# Patient Record
Sex: FEMALE | Race: WHITE | NOT HISPANIC OR LATINO | URBAN - METROPOLITAN AREA
[De-identification: names, ages, dates, MRNs, and addresses within clinical notes are randomized per-mention and may not be internally consistent; named-entity substitution may affect disease eponyms.]

---

## 2017-01-12 ENCOUNTER — APPOINTMENT (OUTPATIENT)
Dept: WOMENS IMAGING | Facility: HOSPITAL | Age: 62
End: 2017-01-12

## 2017-01-12 PROCEDURE — 77067 SCR MAMMO BI INCL CAD: CPT | Performed by: RADIOLOGY

## 2017-01-12 PROCEDURE — 77063 BREAST TOMOSYNTHESIS BI: CPT | Performed by: RADIOLOGY

## 2018-08-13 ENCOUNTER — APPOINTMENT (OUTPATIENT)
Dept: WOMENS IMAGING | Facility: HOSPITAL | Age: 63
End: 2018-08-13

## 2018-08-13 PROCEDURE — 77063 BREAST TOMOSYNTHESIS BI: CPT | Performed by: RADIOLOGY

## 2018-08-13 PROCEDURE — 77067 SCR MAMMO BI INCL CAD: CPT | Performed by: RADIOLOGY

## 2019-08-26 ENCOUNTER — APPOINTMENT (OUTPATIENT)
Dept: WOMENS IMAGING | Facility: HOSPITAL | Age: 64
End: 2019-08-26

## 2019-08-26 PROCEDURE — 77063 BREAST TOMOSYNTHESIS BI: CPT | Performed by: RADIOLOGY

## 2019-08-26 PROCEDURE — 77067 SCR MAMMO BI INCL CAD: CPT | Performed by: RADIOLOGY

## 2020-08-27 ENCOUNTER — APPOINTMENT (OUTPATIENT)
Dept: WOMENS IMAGING | Facility: HOSPITAL | Age: 65
End: 2020-08-27

## 2020-08-27 PROCEDURE — 77063 BREAST TOMOSYNTHESIS BI: CPT | Performed by: RADIOLOGY

## 2020-08-27 PROCEDURE — 77067 SCR MAMMO BI INCL CAD: CPT | Performed by: RADIOLOGY

## 2021-10-19 ENCOUNTER — APPOINTMENT (OUTPATIENT)
Dept: WOMENS IMAGING | Facility: HOSPITAL | Age: 66
End: 2021-10-19

## 2021-10-19 PROCEDURE — 77063 BREAST TOMOSYNTHESIS BI: CPT | Performed by: RADIOLOGY

## 2021-10-19 PROCEDURE — 77067 SCR MAMMO BI INCL CAD: CPT | Performed by: RADIOLOGY

## 2022-10-28 ENCOUNTER — APPOINTMENT (OUTPATIENT)
Dept: WOMENS IMAGING | Facility: HOSPITAL | Age: 67
End: 2022-10-28

## 2022-10-28 PROCEDURE — 77067 SCR MAMMO BI INCL CAD: CPT | Performed by: RADIOLOGY

## 2022-10-28 PROCEDURE — 77063 BREAST TOMOSYNTHESIS BI: CPT | Performed by: RADIOLOGY

## 2024-01-15 ENCOUNTER — ANESTHESIA EVENT (OUTPATIENT)
Dept: PERIOP | Facility: HOSPITAL | Age: 69
End: 2024-01-15
Payer: MEDICARE

## 2024-01-15 RX ORDER — SODIUM CHLORIDE 9 MG/ML
40 INJECTION, SOLUTION INTRAVENOUS AS NEEDED
Status: CANCELLED | OUTPATIENT
Start: 2024-01-15

## 2024-01-15 RX ORDER — SODIUM CHLORIDE 0.9 % (FLUSH) 0.9 %
10 SYRINGE (ML) INJECTION EVERY 12 HOURS SCHEDULED
Status: CANCELLED | OUTPATIENT
Start: 2024-01-15

## 2024-01-15 RX ORDER — FAMOTIDINE 10 MG/ML
20 INJECTION, SOLUTION INTRAVENOUS ONCE
Status: CANCELLED | OUTPATIENT
Start: 2024-01-15 | End: 2024-01-15

## 2024-01-15 RX ORDER — SODIUM CHLORIDE 0.9 % (FLUSH) 0.9 %
10 SYRINGE (ML) INJECTION AS NEEDED
Status: CANCELLED | OUTPATIENT
Start: 2024-01-15

## 2024-01-16 ENCOUNTER — APPOINTMENT (OUTPATIENT)
Dept: GENERAL RADIOLOGY | Facility: HOSPITAL | Age: 69
End: 2024-01-16
Payer: MEDICARE

## 2024-01-16 ENCOUNTER — ANESTHESIA EVENT CONVERTED (OUTPATIENT)
Dept: ANESTHESIOLOGY | Facility: HOSPITAL | Age: 69
End: 2024-01-16
Payer: MEDICARE

## 2024-01-16 ENCOUNTER — ANESTHESIA (OUTPATIENT)
Dept: PERIOP | Facility: HOSPITAL | Age: 69
End: 2024-01-16
Payer: MEDICARE

## 2024-01-16 ENCOUNTER — HOSPITAL ENCOUNTER (OUTPATIENT)
Facility: HOSPITAL | Age: 69
Discharge: HOME | End: 2024-01-16
Attending: ORTHOPAEDIC SURGERY | Admitting: ORTHOPAEDIC SURGERY
Payer: MEDICARE

## 2024-01-16 VITALS
SYSTOLIC BLOOD PRESSURE: 132 MMHG | HEIGHT: 65 IN | RESPIRATION RATE: 16 BRPM | TEMPERATURE: 97.7 F | WEIGHT: 135 LBS | BODY MASS INDEX: 22.49 KG/M2 | HEART RATE: 70 BPM | OXYGEN SATURATION: 92 % | DIASTOLIC BLOOD PRESSURE: 69 MMHG

## 2024-01-16 DIAGNOSIS — S42.032A CLOSED DISPLACED FRACTURE OF ACROMIAL END OF LEFT CLAVICLE, INITIAL ENCOUNTER: Primary | ICD-10-CM

## 2024-01-16 PROCEDURE — 25010000002 SUGAMMADEX 200 MG/2ML SOLUTION: Performed by: NURSE ANESTHETIST, CERTIFIED REGISTERED

## 2024-01-16 PROCEDURE — 25010000002 ONDANSETRON PER 1 MG: Performed by: NURSE ANESTHETIST, CERTIFIED REGISTERED

## 2024-01-16 PROCEDURE — C1713 ANCHOR/SCREW BN/BN,TIS/BN: HCPCS | Performed by: ORTHOPAEDIC SURGERY

## 2024-01-16 PROCEDURE — 25010000002 DEXAMETHASONE PER 1 MG: Performed by: NURSE ANESTHETIST, CERTIFIED REGISTERED

## 2024-01-16 PROCEDURE — 25010000002 CEFAZOLIN PER 500 MG: Performed by: ORTHOPAEDIC SURGERY

## 2024-01-16 PROCEDURE — 25010000002 FENTANYL CITRATE (PF) 50 MCG/ML SOLUTION: Performed by: NURSE ANESTHETIST, CERTIFIED REGISTERED

## 2024-01-16 PROCEDURE — 25010000002 PHENYLEPHRINE 10 MG/ML SOLUTION 1 ML VIAL: Performed by: NURSE ANESTHETIST, CERTIFIED REGISTERED

## 2024-01-16 PROCEDURE — 25010000002 DEXAMETHASONE SODIUM PHOSPHATE 10 MG/ML SOLUTION: Performed by: NURSE ANESTHETIST, CERTIFIED REGISTERED

## 2024-01-16 PROCEDURE — 25010000002 PROPOFOL 10 MG/ML EMULSION: Performed by: NURSE ANESTHETIST, CERTIFIED REGISTERED

## 2024-01-16 PROCEDURE — 25010000002 ROPIVACAINE HCL-NACL 0.2-0.9 % SOLUTION: Performed by: NURSE ANESTHETIST, CERTIFIED REGISTERED

## 2024-01-16 PROCEDURE — 76000 FLUOROSCOPY <1 HR PHYS/QHP: CPT

## 2024-01-16 PROCEDURE — 25810000003 LACTATED RINGERS PER 1000 ML: Performed by: ANESTHESIOLOGY

## 2024-01-16 PROCEDURE — 25010000002 BUPIVACAINE (PF) 0.25 % SOLUTION: Performed by: NURSE ANESTHETIST, CERTIFIED REGISTERED

## 2024-01-16 DEVICE — SCRW MTPHSL S/TAP T8STRDRV 2.7X22MM: Type: IMPLANTABLE DEVICE | Site: CLAVICLE | Status: FUNCTIONAL

## 2024-01-16 DEVICE — IMPLANTABLE DEVICE: Type: IMPLANTABLE DEVICE | Site: CLAVICLE | Status: FUNCTIONAL

## 2024-01-16 DEVICE — SCRW LK S/TAP T8STRDRV 2.7X12MM: Type: IMPLANTABLE DEVICE | Site: CLAVICLE | Status: FUNCTIONAL

## 2024-01-16 DEVICE — SCRW MTPHSL S/TAP T8STRDRV 2.7X16MM: Type: IMPLANTABLE DEVICE | Site: CLAVICLE | Status: FUNCTIONAL

## 2024-01-16 DEVICE — SCRW LK S/TAP T8STRDRV 2.7X16MM: Type: IMPLANTABLE DEVICE | Site: CLAVICLE | Status: FUNCTIONAL

## 2024-01-16 RX ORDER — ALUMINA, MAGNESIA, AND SIMETHICONE 2400; 2400; 240 MG/30ML; MG/30ML; MG/30ML
SUSPENSION ORAL EVERY 6 HOURS PRN
COMMUNITY

## 2024-01-16 RX ORDER — BUPIVACAINE HYDROCHLORIDE 2.5 MG/ML
INJECTION, SOLUTION EPIDURAL; INFILTRATION; INTRACAUDAL
Status: COMPLETED | OUTPATIENT
Start: 2024-01-16 | End: 2024-01-16

## 2024-01-16 RX ORDER — PSEUDOEPHEDRINE HYDROCHLORIDE 60 MG/1
60 TABLET, FILM COATED ORAL EVERY 4 HOURS PRN
COMMUNITY

## 2024-01-16 RX ORDER — EPHEDRINE SULFATE 50 MG/ML
INJECTION INTRAVENOUS AS NEEDED
Status: DISCONTINUED | OUTPATIENT
Start: 2024-01-16 | End: 2024-01-16 | Stop reason: SURG

## 2024-01-16 RX ORDER — MELOXICAM 15 MG/1
15 TABLET ORAL DAILY
COMMUNITY

## 2024-01-16 RX ORDER — FENTANYL CITRATE 50 UG/ML
INJECTION, SOLUTION INTRAMUSCULAR; INTRAVENOUS
Status: COMPLETED | OUTPATIENT
Start: 2024-01-16 | End: 2024-01-16

## 2024-01-16 RX ORDER — TRIAMCINOLONE ACETONIDE 55 UG/1
2 SPRAY, METERED NASAL DAILY
COMMUNITY

## 2024-01-16 RX ORDER — FLUOXETINE HYDROCHLORIDE 40 MG/1
40 CAPSULE ORAL DAILY
COMMUNITY

## 2024-01-16 RX ORDER — DENOSUMAB 60 MG/ML
60 INJECTION SUBCUTANEOUS
COMMUNITY

## 2024-01-16 RX ORDER — ASPIRIN 81 MG/1
81 TABLET ORAL DAILY
COMMUNITY

## 2024-01-16 RX ORDER — DEXAMETHASONE SODIUM PHOSPHATE 10 MG/ML
INJECTION, SOLUTION INTRAMUSCULAR; INTRAVENOUS
Status: COMPLETED | OUTPATIENT
Start: 2024-01-16 | End: 2024-01-16

## 2024-01-16 RX ORDER — LORATADINE 10 MG/1
CAPSULE, LIQUID FILLED ORAL
COMMUNITY

## 2024-01-16 RX ORDER — ONDANSETRON 2 MG/ML
INJECTION INTRAMUSCULAR; INTRAVENOUS AS NEEDED
Status: DISCONTINUED | OUTPATIENT
Start: 2024-01-16 | End: 2024-01-16 | Stop reason: SURG

## 2024-01-16 RX ORDER — LEVOTHYROXINE SODIUM 0.07 MG/1
75 TABLET ORAL DAILY
COMMUNITY

## 2024-01-16 RX ORDER — ONDANSETRON 4 MG/1
4 TABLET, FILM COATED ORAL EVERY 8 HOURS PRN
Qty: 12 TABLET | Refills: 0 | Status: SHIPPED | OUTPATIENT
Start: 2024-01-16

## 2024-01-16 RX ORDER — HYDROCODONE BITARTRATE AND ACETAMINOPHEN 7.5; 325 MG/1; MG/1
1 TABLET ORAL EVERY 4 HOURS PRN
Qty: 24 TABLET | Refills: 0 | Status: SHIPPED | OUTPATIENT
Start: 2024-01-16

## 2024-01-16 RX ORDER — PHENYLEPHRINE HCL IN 0.9% NACL 1 MG/10 ML
SYRINGE (ML) INTRAVENOUS AS NEEDED
Status: DISCONTINUED | OUTPATIENT
Start: 2024-01-16 | End: 2024-01-16 | Stop reason: SURG

## 2024-01-16 RX ORDER — CALCIUM CARBONATE/VITAMIN D3 600 MG-20
600 TABLET,CHEWABLE ORAL
COMMUNITY

## 2024-01-16 RX ORDER — ACETAMINOPHEN 500 MG
500 TABLET ORAL EVERY 6 HOURS PRN
COMMUNITY
End: 2024-01-16 | Stop reason: HOSPADM

## 2024-01-16 RX ORDER — FAMOTIDINE 20 MG/1
20 TABLET, FILM COATED ORAL ONCE
Status: COMPLETED | OUTPATIENT
Start: 2024-01-16 | End: 2024-01-16

## 2024-01-16 RX ORDER — ESOMEPRAZOLE MAGNESIUM 40 MG/1
40 CAPSULE, DELAYED RELEASE ORAL
Status: ON HOLD | COMMUNITY
End: 2024-01-16

## 2024-01-16 RX ORDER — ROCURONIUM BROMIDE 10 MG/ML
INJECTION, SOLUTION INTRAVENOUS AS NEEDED
Status: DISCONTINUED | OUTPATIENT
Start: 2024-01-16 | End: 2024-01-16 | Stop reason: SURG

## 2024-01-16 RX ORDER — MIDAZOLAM HYDROCHLORIDE 1 MG/ML
0.5 INJECTION INTRAMUSCULAR; INTRAVENOUS
Status: DISCONTINUED | OUTPATIENT
Start: 2024-01-16 | End: 2024-01-16 | Stop reason: HOSPADM

## 2024-01-16 RX ORDER — SUCRALFATE 1 G/1
1 TABLET ORAL 4 TIMES DAILY
COMMUNITY

## 2024-01-16 RX ORDER — DEXLANSOPRAZOLE 60 MG/1
60 CAPSULE, DELAYED RELEASE ORAL DAILY
COMMUNITY

## 2024-01-16 RX ORDER — CLONAZEPAM 1 MG/1
1 TABLET ORAL 2 TIMES DAILY PRN
COMMUNITY

## 2024-01-16 RX ORDER — LIDOCAINE HYDROCHLORIDE 10 MG/ML
INJECTION, SOLUTION EPIDURAL; INFILTRATION; INTRACAUDAL; PERINEURAL AS NEEDED
Status: DISCONTINUED | OUTPATIENT
Start: 2024-01-16 | End: 2024-01-16 | Stop reason: SURG

## 2024-01-16 RX ORDER — PROPOFOL 10 MG/ML
VIAL (ML) INTRAVENOUS AS NEEDED
Status: DISCONTINUED | OUTPATIENT
Start: 2024-01-16 | End: 2024-01-16 | Stop reason: SURG

## 2024-01-16 RX ORDER — SODIUM CHLORIDE, SODIUM LACTATE, POTASSIUM CHLORIDE, CALCIUM CHLORIDE 600; 310; 30; 20 MG/100ML; MG/100ML; MG/100ML; MG/100ML
9 INJECTION, SOLUTION INTRAVENOUS CONTINUOUS
Status: DISCONTINUED | OUTPATIENT
Start: 2024-01-16 | End: 2024-01-16 | Stop reason: HOSPADM

## 2024-01-16 RX ORDER — LIDOCAINE HYDROCHLORIDE 10 MG/ML
0.5 INJECTION, SOLUTION EPIDURAL; INFILTRATION; INTRACAUDAL; PERINEURAL ONCE AS NEEDED
Status: COMPLETED | OUTPATIENT
Start: 2024-01-16 | End: 2024-01-16

## 2024-01-16 RX ORDER — PROPRANOLOL HYDROCHLORIDE 10 MG/1
10 TABLET ORAL 2 TIMES DAILY
COMMUNITY

## 2024-01-16 RX ORDER — HYDROMORPHONE HYDROCHLORIDE 1 MG/ML
0.5 INJECTION, SOLUTION INTRAMUSCULAR; INTRAVENOUS; SUBCUTANEOUS
Status: DISCONTINUED | OUTPATIENT
Start: 2024-01-16 | End: 2024-01-16 | Stop reason: HOSPADM

## 2024-01-16 RX ORDER — MAGNESIUM HYDROXIDE 1200 MG/15ML
LIQUID ORAL AS NEEDED
Status: DISCONTINUED | OUTPATIENT
Start: 2024-01-16 | End: 2024-01-16 | Stop reason: HOSPADM

## 2024-01-16 RX ORDER — DEXAMETHASONE SODIUM PHOSPHATE 4 MG/ML
INJECTION, SOLUTION INTRA-ARTICULAR; INTRALESIONAL; INTRAMUSCULAR; INTRAVENOUS; SOFT TISSUE AS NEEDED
Status: DISCONTINUED | OUTPATIENT
Start: 2024-01-16 | End: 2024-01-16 | Stop reason: SURG

## 2024-01-16 RX ORDER — DIVALPROEX SODIUM 250 MG/1
250 TABLET, DELAYED RELEASE ORAL 3 TIMES DAILY
COMMUNITY

## 2024-01-16 RX ORDER — DOCUSATE SODIUM 100 MG/1
100 CAPSULE, LIQUID FILLED ORAL 2 TIMES DAILY
Qty: 20 CAPSULE | Refills: 0 | Status: SHIPPED | OUTPATIENT
Start: 2024-01-16

## 2024-01-16 RX ORDER — ROPIVACAINE HYDROCHLORIDE 2 MG/ML
INJECTION, SOLUTION EPIDURAL; INFILTRATION; PERINEURAL CONTINUOUS
Status: DISCONTINUED | OUTPATIENT
Start: 2024-01-16 | End: 2024-01-16 | Stop reason: HOSPADM

## 2024-01-16 RX ORDER — DEXMEDETOMIDINE HYDROCHLORIDE 100 UG/ML
INJECTION, SOLUTION INTRAVENOUS AS NEEDED
Status: DISCONTINUED | OUTPATIENT
Start: 2024-01-16 | End: 2024-01-16 | Stop reason: SURG

## 2024-01-16 RX ORDER — FENTANYL CITRATE 50 UG/ML
50 INJECTION, SOLUTION INTRAMUSCULAR; INTRAVENOUS
Status: DISCONTINUED | OUTPATIENT
Start: 2024-01-16 | End: 2024-01-16 | Stop reason: HOSPADM

## 2024-01-16 RX ORDER — IBUPROFEN 400 MG/1
400 TABLET ORAL EVERY 6 HOURS PRN
Status: ON HOLD | COMMUNITY
End: 2024-01-16

## 2024-01-16 RX ADMIN — ROCURONIUM BROMIDE 30 MG: 10 SOLUTION INTRAVENOUS at 10:52

## 2024-01-16 RX ADMIN — DEXAMETHASONE SODIUM PHOSPHATE 2 MG: 10 INJECTION, SOLUTION INTRAMUSCULAR; INTRAVENOUS at 09:31

## 2024-01-16 RX ADMIN — SODIUM CHLORIDE 2000 MG: 900 INJECTION INTRAVENOUS at 10:50

## 2024-01-16 RX ADMIN — Medication 100 MCG: at 11:22

## 2024-01-16 RX ADMIN — PHENYLEPHRINE HYDROCHLORIDE 0.3 MCG/KG/MIN: 10 INJECTION INTRAVENOUS at 11:41

## 2024-01-16 RX ADMIN — FAMOTIDINE 20 MG: 20 TABLET ORAL at 08:33

## 2024-01-16 RX ADMIN — SODIUM CHLORIDE, POTASSIUM CHLORIDE, SODIUM LACTATE AND CALCIUM CHLORIDE 9 ML/HR: 600; 310; 30; 20 INJECTION, SOLUTION INTRAVENOUS at 08:00

## 2024-01-16 RX ADMIN — Medication 100 MCG: at 11:35

## 2024-01-16 RX ADMIN — ONDANSETRON 4 MG: 2 INJECTION INTRAMUSCULAR; INTRAVENOUS at 12:58

## 2024-01-16 RX ADMIN — EPHEDRINE SULFATE 10 MG: 50 INJECTION INTRAVENOUS at 11:22

## 2024-01-16 RX ADMIN — DEXAMETHASONE SODIUM PHOSPHATE 4 MG: 4 INJECTION, SOLUTION INTRA-ARTICULAR; INTRALESIONAL; INTRAMUSCULAR; INTRAVENOUS; SOFT TISSUE at 10:52

## 2024-01-16 RX ADMIN — EPHEDRINE SULFATE 5 MG: 50 INJECTION INTRAVENOUS at 11:13

## 2024-01-16 RX ADMIN — DEXMEDETOMIDINE HYDROCHLORIDE 8 MCG: 100 INJECTION, SOLUTION INTRAVENOUS at 13:05

## 2024-01-16 RX ADMIN — Medication 1000 MG: at 13:24

## 2024-01-16 RX ADMIN — SUGAMMADEX 100 MG: 100 INJECTION, SOLUTION INTRAVENOUS at 13:01

## 2024-01-16 RX ADMIN — Medication 100 MCG: at 11:07

## 2024-01-16 RX ADMIN — FENTANYL CITRATE 100 MCG: 50 INJECTION, SOLUTION INTRAMUSCULAR; INTRAVENOUS at 09:30

## 2024-01-16 RX ADMIN — BUPIVACAINE HYDROCHLORIDE 15 ML: 2.5 INJECTION, SOLUTION EPIDURAL; INFILTRATION; INTRACAUDAL at 09:38

## 2024-01-16 RX ADMIN — LIDOCAINE HYDROCHLORIDE 30 MG: 10 SOLUTION INTRAVENOUS at 10:52

## 2024-01-16 RX ADMIN — DEXMEDETOMIDINE HYDROCHLORIDE 8 MCG: 100 INJECTION, SOLUTION INTRAVENOUS at 10:52

## 2024-01-16 RX ADMIN — LIDOCAINE HYDROCHLORIDE 0.2 ML: 10 INJECTION, SOLUTION EPIDURAL; INFILTRATION; INTRACAUDAL; PERINEURAL at 08:00

## 2024-01-16 RX ADMIN — BUPIVACAINE HYDROCHLORIDE 10 ML: 2.5 INJECTION, SOLUTION EPIDURAL; INFILTRATION; INTRACAUDAL; PERINEURAL at 09:31

## 2024-01-16 RX ADMIN — EPHEDRINE SULFATE 5 MG: 50 INJECTION INTRAVENOUS at 11:17

## 2024-01-16 RX ADMIN — PROPOFOL 120 MG: 10 INJECTION, EMULSION INTRAVENOUS at 10:52

## 2024-01-16 RX ADMIN — PROPOFOL 30 MG: 10 INJECTION, EMULSION INTRAVENOUS at 11:46

## 2024-01-16 NOTE — PLAN OF CARE
Problem: Adult Inpatient Plan of Care  Goal: Plan of Care Review  Outcome: Met  Flowsheets (Taken 1/16/2024 1713)  Progress: improving  Plan of Care Reviewed With: patient  Outcome Evaluation: VSS, pt reports no pain at this time, tolerating fluids and crackers by mouth, able to urinate twice since arrival to floor, and ambulating w/ staff. Infublock in place, pt and family have watched information video on nerve block and report they have no questions at this time. Pt to be discharged home.  Goal: Patient-Specific Goal (Individualized)  Outcome: Met  Goal: Absence of Hospital-Acquired Illness or Injury  Outcome: Met  Goal: Optimal Comfort and Wellbeing  Outcome: Met  Goal: Readiness for Transition of Care  Outcome: Met   Goal Outcome Evaluation:  Plan of Care Reviewed With: patient        Progress: improving  Outcome Evaluation: VSS, pt reports no pain at this time, tolerating fluids and crackers by mouth, able to urinate twice since arrival to floor, and ambulating w/ staff. Infublock in place, pt and family have watched information video on nerve block and report they have no questions at this time. Pt to be discharged home.

## 2024-01-16 NOTE — INTERVAL H&P NOTE
"[facility] Pre-op    Full history and physical note from office is attached.    VS: /74  HR 67  RR 16  T 97.4  sat 98%RA    Immunizations:  Influenza:  UTD  Pneumococcal:  No  Tetanus:  UTD  Covid : x2    LAB Results:  No results found for: \"WBC\", \"HGB\", \"HCT\", \"MCV\", \"PLT\", \"NEUTROABS\", \"GLUCOSE\", \"BUN\", \"CREATININE\", \"EGFRIFNONA\", \"EGFRIFAFRI\", \"NA\", \"K\", \"CL\", \"CO2\", \"MG\", \"PHOS\", \"CALCIUM\", \"ALBUMIN\", \"AST\", \"ALT\", \"BILITOT\", \"PTT\", \"INR\"    Cancer Staging (if applicable)  Cancer Patient: __ yes __no __unknown__N/A; If yes, clinical stage T:__ N:__M:__, stage group or __N/A      Impression: Left clavicle fracture       Plan: OPEN REDUCTION INTERNAL FIXATION CLAVICLE AND CORACOCLAVICULAR LIGAMENT REPAIR - LEFT       Maine Shaw, APRN   1/16/2024   08:07 EST  "

## 2024-01-16 NOTE — ANESTHESIA PROCEDURE NOTES
"Peripheral Block      Patient reassessed immediately prior to procedure    Reason for block: at surgeon's request and post-op pain management  Performed by  CRNA/CAA: David Parker, CRNA  Assisted by: Stella Conklin RN  Preanesthetic Checklist  Completed: patient identified, IV checked, site marked, risks and benefits discussed, surgical consent, monitors and equipment checked, pre-op evaluation and timeout performed  Prep:  Pt Position: right lateral decubitus  Sterile barriers:mask, cap, washed/disinfected hands and gloves  Patient monitoring: EKG, continuous pulse oximetry and blood pressure monitoring  Procedure  Performed under: local infiltration  Guidance:ultrasound guided  Images:still images obtained, printed/placed on chart    Laterality:left  Block Type:superficial cervical plexus  Injection Technique:single-shot  Needle Type:echogenic and short-bevel  Needle Gauge:20 G  Resistance on Injection: none    Medications Used: bupivacaine PF (MARCAINE) 0.25 % injection - Injection   10 mL - 1/16/2024 9:31:00 AM  dexamethasone sodium phosphate injection - Injection   2 mg - 1/16/2024 9:31:00 AM  fentaNYL citrate (PF) (SUBLIMAZE) injection - Intravenous   100 mcg - 1/16/2024 9:30:00 AM      Medications  Preservative Free Saline:2ml    Post Assessment  Injection Assessment: negative aspiration for heme, no paresthesia on injection and incremental injection  Patient Tolerance:comfortable throughout block  Complications:no  Additional Notes  A high-frequency linear transducer, with sterile cover, was placed on the lateral neck, overlying the Sternocleidomastoid Muscle (SCM). The structures that were identified included the SCM, Middle Scalene Muscle, and C5-C7 nerve roots. The insertion site was prepped in sterile fashion and then localized with 2-5 ml of 1% Lidocaine. Using ultrasound-guidance, a 20-gauge B-Coleman 4\" Ultraplex 360 non-stimulating echogenic needle was advanced in plane, from lateral to medial, " until the needle tip resided in the fascial layer underneath the SCM. 1-3ml of preservative free normal saline was used to hydro-dissect the fascial planes. After the fascial plane was verified, the local anesthetic (LA) was injected. Care was taken to prevent LA from spreading over the top of C5 to minimize phrenic nerve involvement. Aspiration every 5 ml to prevent intravascular injection. Injection was completed with negative aspiration of blood and negative intravascular injection. Injection pressures were normal with minimal resistance.

## 2024-01-16 NOTE — ANESTHESIA PROCEDURE NOTES
Airway  Urgency: elective    Date/Time: 1/16/2024 10:54 AM  Airway not difficult    General Information and Staff    Patient location during procedure: OR  CRNA/CAA: Vick Johnson, CRNA    Indications and Patient Condition  Indications for airway management: airway protection    Preoxygenated: yes  MILS not maintained throughout  Mask difficulty assessment: 1 - vent by mask    Final Airway Details  Final airway type: endotracheal airway      Successful airway: ETT  Cuffed: yes   Successful intubation technique: video laryngoscopy  Endotracheal tube insertion site: oral  Blade: Moreno  Blade size: 3  ETT size (mm): 7.0  Cormack-Lehane Classification: grade I - full view of glottis  Placement verified by: chest auscultation and capnometry   Measured from: lips  ETT/EBT  to lips (cm): 20  Number of attempts at approach: 1  Assessment: lips, teeth, and gum same as pre-op and atraumatic intubation    Additional Comments  Negative epigastric sounds, Breath sound equal bilaterally with symmetric chest rise and fall

## 2024-01-16 NOTE — DISCHARGE INSTRUCTIONS
InfuBLOCK - Patient Information    What is a pain pump?  The InfuBLOCK pump delivers post-operative, non-narcotic, numbing medication to the nerve near the surgical site for pain relief.     Where can I find information about my pain pump?           For more information about your pain pump, scan the QR code.  For additional patient resources, visit [email or web address].                                                                                               While your physician is your primary source for information about your treatment there may be times during your treatment that you need assistance with your infusion pump.     If you need assistance take the following steps:    The [facility] Nursing Hotline is Here for You 24/7.  Please call [phone] for the following concerns or complications:    Answers to questions about your infusion pump                 Tubing disconnect  Assistance with pump alarms                                                      Dislodged catheter  Excessive leakage noted from pump                                         Inadequate pain control    2.   [facility] Anesthesia Acute Pain Service: [phone] is available 24/7 for any further needs or concerns about medication or pain control.     -------------------------------------------------------------------------    Nerve Catheter Removal Instructions  When your device is empty:    Remove your catheter by pulling the dressing off slowly (like you would remove a regular bandage). The catheter should pull right out of the skin.  Check that the BLUE tip is intact.                                                                                     If the catheter is stuck, reposition your   extremity and pull slowly until removed.  *If catheter is HURTING and WON'T come out, stop and call [phone] for further assistance.    Remove medication bag from the black carrying case.  Cut the  tubing on right and left side of pump, and discard the medication bag and tubing into garbage.  Place the pump and black carrying case into the plastic bag and then place this into the return box.  Seal box with blue stickers and return to [employer] postal service. THIS IS PRE-PAID POSTAGE.        -------------------------------------------------------------------------    [facility] COLD THERAPY - PATIENT INSTRUCTION SHEET    Cold Compression Therapy for your comfort and rehabilitation  Your caregivers want you to be productive in your rehab and comfortable during your stay. In keeping with those goals, you will be receiving an SMI Cold Therapy Wrap to help ease post-operative pain and swelling that might keep you from getting back on track! Your SMI Cold Therapy Wrap is effective and simple-to-use, and you will be encouraged to apply it throughout your hospital stay and at home through the duration of your recovery.    When you are ready to go home  Be sure to take your SMI Cold Therapy Wrap and both sets of Gel Bags with you for continued comfort and use throughout your rehabilitation. If you don't already have them, ask your nurse or aide to retrieve your SMI Gel Bags from the patient freezer.    Home use precautions  Always follow your medical professional's application instructions upon discharge. Your SMI Cold Therapy Wrap and Gel Bags are designed to last for months following your surgery. Never heat the Gel Bags unless specified by your healthcare provider. Supervision is advised when using this product on children or geriatric patients. To avoid danger of suffocation, please keep the outer plastic packaging away from children & pets.    Cold Therapy Instructions  Place Gel Bags in a freezer set ¾ of the way to max temperature for at least (4) hours. For best results, lay the Gel Bags flat and [id number] in the freezer. Once frozen, slide Gel Bags into the gel pouch and secure your wrap to the affected area with the  straps.  Gel wraps that have been stored in a freezer for an extended period of time may require a (10) minute period of softening up in a room temperature environment before application.  The gel pouch acts as a protective barrier. NEVER place frozen bags directly onto skin, as this may cause frostbite injury.  The [place] [facility] Cold Therapy Wrap is designed to be able to be worm while ambulating. The compression straps can be secured well enough so that the Wrap won't fall off while moving.  Wrap Application Videos can be viewed at [employer].  An additional protective barrier such as clothing, a washcloth, hand-towel or pillowcase may be used during prolonged treatment applications.  The Gel-Pouch and Wrap are both Latex-Free and the Gel Bag ingredients are non toxic.    [place] [facility] Wrap care instructions  The [place] [facility] Cold Therapy Wrap may be hand washed and hung to dry when needed.    [place] [facility] re-order information  Additional [place] [facility] body specific wraps and/or Gel Bags can be re-ordered from [employer] or call [email or web address] ([phone])

## 2024-01-16 NOTE — ANESTHESIA PREPROCEDURE EVALUATION
Anesthesia Evaluation     Patient summary reviewed and Nursing notes reviewed                Airway   Mallampati: I  TM distance: >3 FB  Neck ROM: full  No difficulty expected  Dental - normal exam     Comment: PERM BRIDGES    Pulmonary - normal exam   (+) a smoker Former,  Cardiovascular - negative cardio ROS and normal exam        Neuro/Psych  (+) headaches (FREQUENT MIGRAINES), psychiatric history Anxiety and Depression    ROS Comment: RLS  GI/Hepatic/Renal/Endo    (+) GERD, thyroid problem hypothyroidism    Musculoskeletal     (+) back pain  Abdominal  - normal exam    Bowel sounds: normal.   Substance History - negative use     OB/GYN negative ob/gyn ROS         Other          Other Comment: ECZEMA                    Anesthesia Plan    ASA 2     general     (INTERSCALENE CATHETER FOR POSTOP PAIN)  intravenous induction     Anesthetic plan, risks, benefits, and alternatives have been provided, discussed and informed consent has been obtained with: patient.    Plan discussed with CRNA.        CODE STATUS:

## 2024-01-16 NOTE — ANESTHESIA POSTPROCEDURE EVALUATION
Patient: Shasta Dent    Procedure Summary       Date: 01/16/24 Room / Location:  [facility]  [facility]    Anesthesia Start: 1043 Anesthesia Stop: 1316    Procedure: OPEN REDUCTION INTERNAL FIXATION OF CLAVICLE - LEFT (Left: Shoulder) Diagnosis:     Surgeons: Gino Randhawa Jr., MD Provider: Tayo Sommer MD    Anesthesia Type: general ASA Status: 2            Anesthesia Type: general    Vitals  No vitals data found for the desired time range.          Post Anesthesia Care and Evaluation    Patient location during evaluation: PACU  Patient participation: complete - patient participated  Level of consciousness: sleepy but conscious  Pain management: adequate    Airway patency: patent  Anesthetic complications: No anesthetic complications  PONV Status: none  Cardiovascular status: hemodynamically stable and acceptable  Respiratory status: nonlabored ventilation, acceptable and nasal cannula  Hydration status: acceptable

## 2024-01-16 NOTE — ANESTHESIA PROCEDURE NOTES
Peripheral Block      Patient reassessed immediately prior to procedure    Patient location during procedure: pre-op  Reason for block: at surgeon's request and post-op pain management  Performed by  CRNA/CAA: David Parker CRNA  Assisted by: Stella Conklin RN  Preanesthetic Checklist  Completed: patient identified, IV checked, site marked, risks and benefits discussed, surgical consent, monitors and equipment checked, pre-op evaluation and timeout performed  Prep:  Pt Position: right lateral decubitus  Sterile barriers:cap, gloves, mask and washed/disinfected hands  Prep: ChloraPrep  Patient monitoring: blood pressure monitoring, continuous pulse oximetry and EKG  Procedure    Sedation: yes  Performed under: local infiltration  Guidance:ultrasound guided    ULTRASOUND INTERPRETATION.  Using ultrasound guidance a 20 G gauge needle was placed in close proximity to the nerve, at which point, under ultrasound guidance anesthetic was injected in the area of the nerve and spread of the anesthesia was seen on ultrasound in close proximity thereto.  There were no abnormalities seen on ultrasound; a digital image was taken; and the patient tolerated the procedure with no complications. Images:still images obtained, printed/placed on chart    Laterality:left  Block Type:interscalene  Injection Technique:catheter  Needle Type:Tuohy and echogenic  Needle Gauge:18 G  Resistance on Injection: none  Catheter Size:20 G (20g)  Cath Depth at skin: 8 cm    Medications Used: bupivacaine PF (MARCAINE) 0.25 % injection - Injection   15 mL - 1/16/2024 9:38:00 AM      Medications  Preservative Free Saline:5ml    Post Assessment  Injection Assessment: negative aspiration for heme, no paresthesia on injection and incremental injection  Patient Tolerance:comfortable throughout block  Complications:no  Additional Notes  CATHETER  A high-frequency linear transducer, with sterile cover, was placed in the supraclavicular fossa to identify  "the subclavian artery and trunks and divisions of the brachial plexus. The transducer was then moved in a cephalad orientation with a slight rotation to continue visualization of the brachial plexus from the trunks and divisions, on to the C5-C7 roots. The insertion site was prepped and draped in sterile fashion. Skin and cutaneous tissue was infiltrated with 2-5 ml of 1% Lidocaine. Using ultrasound-guidance, an 18-gauge Contiplex Ultra 360 Touhy needle was advanced in plane from lateral to medial. Preservative-free normal saline was utilized for hydro-dissection of tissue, advancement of Touhy, and to confirm final needle placement at the fascial plane between the middle scalene muscle and sheath of the brachial plexus (C5-C7). A 20-gauge Contiplex Echo catheter was placed through the needle and advance out the tip of the Touhy 3-5 cm with the \"Valverde Flip\". The Touhy needle was then removed, and final catheter position verified lateral to the brachial plexus with local anesthetic (LA) and ultrasound visualization. The catheter was secured in the usual fashion with skin glue, benzoin, steri-strips, CHG tegaderm and label noting \"Nerve Block Catheter\". Jerk tape applied at yellow connector and catheter connection. All LA was injected in increments of 3-5 ml after catheter secured. Aspiration every 5 ml to prevent intravascular injection. Injection was completed with negative aspiration of blood and negative intravascular injection. Injection pressures were normal with minimal resistance.             "

## 2024-01-16 NOTE — OP NOTE
CLAVICLE OPEN REDUCTION INTERNAL FIXATION  Procedure Report    Patient Name:  Shasta Dent  YOB: 1955    Date of Surgery:  1/16/2024     Indications:  68 year old female s/p fall with resultant displaced type IIB distal clavicle fracture. Given the degree of displacement, we discussed the risk and benefits of performing open reduction internal fixation. The risks and benefits were discussed with the patient to include, but not limited to: bleeding, infection, nerve injury, failure of fixation, need for further procedures, loss of limb or life.       Pre-op Diagnosis:        Left distal clavicle fracture    Post-op Diagnosis:         Same    Procedure/CPT® Codes:  32503    Procedure(s):  OPEN REDUCTION INTERNAL FIXATION CLAVICLE     Staff:  Surgeon(s):  Gino Randhawa Jr., MD    Circulator: Santana Eagle RN; Kalyn Lam RN  Radiology Technologist: Jeannine Smith Person: Maurice Arroyo  Vendor Representative: Juan Huddleston  Nursing Assistant: Margareth Frank PCT  Assistant: Deonna Raygoza PA-C     Assistant: Deonna Raygoza PA-C  was responsible for performing the following activities: Retraction, Suction, Irrigation, Suturing, Closing, and Placing Dressing and their skilled assistance was necessary for the success of this case.    Anesthesia: General with Block    Estimated Blood Loss:  50 cc    Implants:    Implant Name Type Inv. Item Serial No.  Lot No. LRB No. Used Action   KT INST REMOV FIBULINK 1P/U - [id number] Implant KT INST REMOV FIBULINK 1P/U  DEPUY SYNTHES  Left 1 Implanted   PLT CLAV VA/LCP LAT SS SZ/CS1 2.7MM LT STRL - [id number] Implant PLT CLAV VA/LCP LAT SS SZ/CS1 2.7MM LT STRL  DEPUY SYNTHES  Left 1 Implanted   SCRW MTPHSL S/TAP [id number] 2.7X22MM - [id number] Implant SCRW MTPHSL S/TAP [id number] 2.7X22MM  DEPUY SYNTHES  Left 1 Implanted   SCRW MTPHSL S/TAP [id number] 2.7X16MM - [id number] Implant SCRW MTPHSL S/TAP [id number] 2.7X16MM  DEPUY  SYNTHES  Left 2 Implanted   SCRW LK S/TAP [id number] 2.7X16MM - [id number] Implant SCRW LK S/TAP [id number] 2.7X16MM  DEPUY SYNTHES  Left 4 Implanted   SCRW LK S/TAP [id number] 2.7X12MM - [id number] Implant SCRW LK S/TAP [id number] 2.7X12MM  DEPUY SYNTHES  Left 1 Implanted       Specimen:                None      Findings: Displaced type IIb distal clavicle fracture    Complications: none apparent    Description of Procedure: After informed consent had been obtained, the left upper extremity was identified as the correct surgical extremity and marked. The patient then received a pre-operative peripheral nerve block. The patient was then taken back to the operating suite and was placed on the operating table. General anesthesia was induced and the patient was intubated.  Patient was then placed in the relaxed beachchair position.  A timeout was performed with the entire surgical staff present.  We made a 8 cm incision the surgical extremity was then prepped and draped in the usual, sterile fashion.  Traveling from the mid clavicle laterally towards the AC joint.  Sharp section was carried through the subcutaneous tissue.  We then identified the fascia overlying the clavicle and incised it in line with our skin incision, exposing the lateral clavicle and fracture site.  We then debrided the fracture site thoroughly and irrigated the wound to remove any nonviable soft tissue.  The fracture was then reduced and held in place with a Celina wire displaced laterally from the acromion across the AC joint and across the fracture sites.  We then attempted to use a Synthes fibullink for CC repair; however, we did not achieve excellent fixation within the coracoid, thus aborted this portion of the procedure.  Given we had adequate reduction of the fracture, we then placed a 2.7 mm lateral clavicle plate.  The plate was reduced the bone laterally first by placing a cortically fixed nonlocking screw.  Next, a bicortically device was  then placed medial to the fracture site, achieving excellent fixation of the fracture and adequate compression across the fracture site.  We placed 2 additional nonlocking screws medial to the fracture.  Next, multiple bicortically fixed locking screws were then placed lateral to the fracture site, taking care to avoid injury to the AC joint laterally.  Finally, our laterally fixed bicortical nonlocking screw was removed and replaced with a nonlocking screw.  Final fluoroscopic views demonstrated excellent reduction of the fracture without any sign of hardware complication.  The interval had reduced anatomically, as well.  We irrigated the wound thoroughly.  The fascia was closed with 0 Vicryl suture.  The subcutaneous tissue was closed with 2-0 Vicryl suture.  The skin was closed with an Exofin mesh dressing.    The patient was then awakened from anesthesia, extubated, transferred to the [facility], and transferred to the post anesthesia care unit in stable condition.    The plan for Mrs. Dent is to remain NWB LUE with sling for the next 6 weeks. She will f/u in 2 weeks for a wound check and post operative XRs of L clavicle.          Gino Randhawa Jr., MD     Date: 1/16/2024  Time: 13:10 EST

## (undated) DEVICE — TBG PENCL TELESCP MEGADYNE SMOKE EVAC 10FT

## (undated) DEVICE — SYS CLS SKIN PREMIERPRO EXOFINFUSION 22CM

## (undated) DEVICE — PAD,ARMBOARD,CONV,FOAM,2X8X20",12PR/CS: Brand: MEDLINE

## (undated) DEVICE — GOWN,REINFORCE,POLY,SIRUS,BREATH SLV,XLG: Brand: MEDLINE

## (undated) DEVICE — PK MAJ SHLDR SPLT 10

## (undated) DEVICE — ANTIBACTERIAL UNDYED BRAIDED (POLYGLACTIN 910), SYNTHETIC ABSORBABLE SUTURE: Brand: COATED VICRYL

## (undated) DEVICE — GLV SURG PREMIERPRO GAMMEX NEOPRN PF SZ8 GRN

## (undated) DEVICE — GLV SURG SENSICARE PI LF PF 7.0

## (undated) DEVICE — IMPLANTABLE DEVICE
Type: IMPLANTABLE DEVICE | Site: CLAVICLE | Status: NON-FUNCTIONAL
Removed: 2024-01-16

## (undated) DEVICE — C-ARM: Brand: UNBRANDED

## (undated) DEVICE — BIT DRL QC CALIB 2X110X30MM NS

## (undated) DEVICE — BNDG ELAS CO-FLEX SLF ADHR 4IN5YD LF STRL

## (undated) DEVICE — GLV SURG PREMIERPRO MIC LTX PF SZ8 BRN

## (undated) DEVICE — GLV SURG SENSICARE PI MIC PF SZ7 LF STRL

## (undated) DEVICE — GLV SURG SENSICARE PI LF PF 7.5

## (undated) DEVICE — KT PUMP INFUBLOCK MDL 2100 PMKITSOLIS

## (undated) DEVICE — GLV SURG PREMIERPRO MIC LTX PF SZ7.5 BRN

## (undated) DEVICE — ARM SLING: Brand: DEROYAL

## (undated) DEVICE — ANTIBACTERIAL UNDYED BRAIDED (POLYGLACTIN 910), SYNTHETIC ABSORBABLE SURGICAL SUTURE: Brand: COATED VICRYL

## (undated) DEVICE — POSTN HD UNIV

## (undated) DEVICE — KT REPR FIBULINK SYNDESMOSIS SS STRL
Type: IMPLANTABLE DEVICE | Site: CLAVICLE | Status: NON-FUNCTIONAL
Removed: 2024-01-16

## (undated) DEVICE — PATIENT RETURN ELECTRODE, SINGLE-USE, CONTACT QUALITY MONITORING, ADULT, WITH 9FT CORD, FOR PATIENTS WEIGING OVER 33LBS. (15KG): Brand: MEGADYNE

## (undated) DEVICE — GLV SURG PREMIERPRO MIC LTX PF SZ7 BRN

## (undated) DEVICE — BIT DRL QC CALIB 2.8X135X45MM NS

## (undated) DEVICE — TRAP FLD MINIVAC MEGADYNE 100ML

## (undated) DEVICE — BLANKT WARM LOWR/BDY 100X120CM